# Patient Record
Sex: FEMALE | Race: OTHER | URBAN - NONMETROPOLITAN AREA
[De-identification: names, ages, dates, MRNs, and addresses within clinical notes are randomized per-mention and may not be internally consistent; named-entity substitution may affect disease eponyms.]

---

## 2024-04-25 ENCOUNTER — OFFICE VISIT (OUTPATIENT)
Dept: ENDOCRINOLOGY | Facility: CLINIC | Age: 44
End: 2024-04-25
Payer: COMMERCIAL

## 2024-04-25 VITALS
HEIGHT: 59 IN | DIASTOLIC BLOOD PRESSURE: 76 MMHG | HEART RATE: 104 BPM | SYSTOLIC BLOOD PRESSURE: 104 MMHG | OXYGEN SATURATION: 99 % | BODY MASS INDEX: 40.72 KG/M2 | WEIGHT: 202 LBS

## 2024-04-25 DIAGNOSIS — E66.01 CLASS 3 SEVERE OBESITY DUE TO EXCESS CALORIES WITH SERIOUS COMORBIDITY AND BODY MASS INDEX (BMI) OF 40.0 TO 44.9 IN ADULT (HCC): Primary | ICD-10-CM

## 2024-04-25 DIAGNOSIS — R79.89 ELEVATED DHEA: ICD-10-CM

## 2024-04-25 DIAGNOSIS — E88.819 INSULIN RESISTANCE: ICD-10-CM

## 2024-04-25 PROCEDURE — 99203 OFFICE O/P NEW LOW 30 MIN: CPT | Performed by: STUDENT IN AN ORGANIZED HEALTH CARE EDUCATION/TRAINING PROGRAM

## 2024-04-25 RX ORDER — SEMAGLUTIDE 0.25 MG/.5ML
0.25 INJECTION, SOLUTION SUBCUTANEOUS WEEKLY
COMMUNITY

## 2024-04-25 NOTE — PROGRESS NOTES
Adrianne Ames 43 y.o. female MRN: [id number]    Encounter: [id number]      Assessment/Plan     Elevated DHEAS  Class III obesity  Insulin resistance    Reassurance provided for DHEAS elevation, which is mild. Patient is asymptomatic and having regular periods. I suspect this result may be due to insulin resistance, which is suggested by insulin testing. We discussed nutritional approaches for optimization of insulin sensitivity, including focus on whole food products and avoidance of processed foods. Adrianne will work to optimize her aerobic and strength/resistance training. She intends to pursue initiation of wegovy, per our discussion. And she is aware that I am available to her in the future to comment on any additional testing or to address any concerns. At present, no further investigation will be pursued.     Problem List Items Addressed This Visit    None  Visit Diagnoses     Class 3 severe obesity due to excess calories with serious comorbidity and body mass index (BMI) of 40.0 to 44.9 in adult (HCC)    -  Primary    Insulin resistance        Elevated DHEA            RTC PRN    CC: abnormal labs    History of Present Illness     HPI:    Adrianne presents today for endocrine evaluation of elevated hormone labs. Patient reports recent testing from PCP including high DHEAS. Reports difficulty with weight. Has been following lifestyle plan including nutrition, calorie counting (uses LoseIt!), and walking 12-15k steps per day. She is looking to expand workup program to include bushra and strength training. Works job in IT. Reports routine and expected periods. No symptoms of oily skin, acne, undesired coarse hair growth or male pattern hair loss. Has had roughly 8lb weight loss in past 3-mo. She has been Rx wegovy, but has not initiated use. She denies pancreatitis or fam hx MTC/MEN.     Review of Systems   Constitutional:  Positive for fatigue. Negative for diaphoresis.   Gastrointestinal:  Negative for nausea and  "vomiting.        Bloating   Endocrine: Negative for polydipsia and polyuria.   All other systems reviewed and are negative.      Historical Information   Past Medical History:   Diagnosis Date   • Seasonal allergies      History reviewed. No pertinent surgical history.  Social History   Social History     Substance and Sexual Activity   Alcohol Use Yes     Social History     Substance and Sexual Activity   Drug Use Not Currently   • Types: Marijuana    Comment: Medical card     Social History     Tobacco Use   Smoking Status Former   • Current packs/day: 0.00   • Types: Cigarettes   • Quit date:    • Years since quittin.3   Smokeless Tobacco Never     Family History:   Family History   Problem Relation Age of Onset   • Diabetes type II Mother    • Liver cancer Father    • Diabetes type II Maternal Grandfather        Meds/Allergies   Current Outpatient Medications   Medication Sig Dispense Refill   • Semaglutide-Weight Management (Wegovy) 0.25 MG/0.5ML Inject 0.25 mg under the skin once a week     • Loratadine 10 MG CHEW Chew 10 mg daily       No current facility-administered medications for this visit.     No Known Allergies    Objective   Vitals: Blood pressure 104/76, pulse 104, height 4' 11\" (1.499 m), weight 91.6 kg (202 lb), SpO2 99%.    Physical Exam  Vitals reviewed.   Constitutional:       General: She is not in acute distress.     Appearance: Normal appearance.   HENT:      Head: Normocephalic and atraumatic.      Nose: Nose normal.   Eyes:      General: No scleral icterus.     Conjunctiva/sclera: Conjunctivae normal.   Pulmonary:      Effort: Pulmonary effort is normal. No respiratory distress.   Abdominal:      Palpations: Abdomen is soft.      Tenderness: There is no abdominal tenderness.   Musculoskeletal:         General: No deformity.      Cervical back: Normal range of motion.   Skin:     General: Skin is warm and dry.   Neurological:      General: No focal deficit present.      Mental " "Status: She is alert.   Psychiatric:         Mood and Affect: Mood normal.         Behavior: Behavior normal.         The history was obtained from the review of the chart, patient.    Lab Results:   Lab Results   Component Value Date/Time    Potassium 4.6 04/04/2024 07:48 AM    Potassium 4.5 06/30/2023 06:39 AM    Potassium 4.0 06/25/2023 12:19 PM    Chloride 105 04/04/2024 07:48 AM    Chloride 107 06/30/2023 06:39 AM    Chloride 109 (H) 06/25/2023 12:19 PM    Carbon Dioxide 20 (L) 04/04/2024 07:48 AM    Carbon Dioxide 21 (L) 06/30/2023 06:39 AM    Carbon Dioxide 19 (L) 06/25/2023 12:19 PM    BUN 26 (H) 04/04/2024 07:48 AM    BUN 17 06/30/2023 06:39 AM    BUN 11 06/25/2023 12:19 PM    Creatinine 0.7 04/04/2024 07:48 AM    Creatinine 0.7 06/30/2023 06:39 AM    Creatinine 0.6 06/25/2023 12:19 PM    Calcium 9.5 04/04/2024 07:48 AM    Calcium 9.2 06/30/2023 06:39 AM    Calcium 9.0 06/25/2023 12:19 PM    EGFR >90 04/04/2024 07:48 AM    EGFR >90 06/30/2023 06:39 AM    EGFR >90 06/25/2023 12:19 PM         Ref Range & Units 3 wk ago Comments   Insulin  3 - 25 uU/mL 14 The above reference interval is based on fasting status.     Component  Ref Range & Units 3 wk ago Comments   DHEA-Sulfate  19 - 231 mcg/dL 352 High  DHEA-S values fall with advancing age. For reference,  the reference intervals for 31-40 year old patients  are: Female  mcg/dL and Male 106-464 mcg/dL.    Test Performed at:  [facility] [address]  [id number]  José Acuna M.D., Ph.D.,Director of Laboratories       Imaging Studies:  ?  ?     I have personally reviewed pertinent reports.      Portions of the record may have been created with voice recognition software. Occasional wrong word or \"sound a like\" substitutions may have occurred due to the inherent limitations of voice recognition software. Read the chart carefully and recognize, using context, where substitutions have occurred.    "

## 2024-04-25 NOTE — PATIENT INSTRUCTIONS
"Try to eat real whole foods, as close to their original form as possible and avoid processed foods, or meats with nitrates - shop the perimeter of the store!    Remember the principles:  Protein at every meal  Veggies at least two meals/day  Small amounts of good fat at each meal stabilize blood sugar and help with hunger    Great resources include:  dietHealthyChic  Kelly's low carb website: www.[employer]/lowcarb/    Blood Sugar and insulin are critical to weight  Blood sugar response varies based on what food is eaten  Fats and protein provide longer-term energy without causing spikes in blood sugar. Carbohydrates break into glucose and cause blood sugar spikes.  The body wants to keep the blood sugar in the normal range  Insulin is a hormone made by the pancreas in response to increased blood sugar (glucose)  Insulin tells the body to store the unused sugar as fat and blocks the breakdown of fat stores (extra glucose from carbs is stored in fat cells around our middle, in our liver and a little bit in muscle)    So, the key to controlling weight, preventing metabolic disease, and improving energy is to regulate blood sugar and therefore, insulin release.    Low Carb Food Plan: The Basics    Principles:  Eat 3 servings (4-6 ounces) of protein each day, one serving at each meal  Eat lots of non-starchy vegetables, at least 8 (1/2 cup) servings/day (or, >15 \"net carbs\")  Eat small amounts (1 Tbsp) of added fat at each meal  Eat small amounts from the “other carbohydrate” category, up to 6 servings/day. Serving size varies with each category of “other carbohydrate”. In general, a serving should have 10 grams or fewer carbohydrates. The number of servings you will eat is based on your \"net carb\" target.    Protein: seafood (fish and shellfish), poultry, eggs, beef, buffalo, lamb, veal, venison, pork    Non-starchy vegetables: All vegetables except starchy vegetables (see below)    Fat: olive, coconut, flaxseed, sesame, " "walnut, avocado oils, butter, mayonnaise, salad dressings with less than 2g carb/2 Tbs. serving    Other carbohydrates:  Nuts, seeds, nut butter and nut flours  Cheese and Dairy  Beans  Starchy vegetables (beets, carrots, corn, parsnips, peas, potato (all), rutabaga, squash (acorn and butternut, only)  Whole grains (e.g. barley, brown rice, cous cous, grits, oatmeal, polenta, whole wheat pasta, whole wheat bread)  Alcohol    References: www.[employer].[place], www.dietdoctor.com, www.[employer].[place]    If you develop dizziness/fatigue or muscle cramping  you can add salt to your food or drink bouillon/broth.    Remember:  Net carbs(grams)=total carbs (grams) - fiber (grams) - sugar alcohols (grams)    Low Carb Replacement Options    Bread/Wraps  [place], Carb style, slice 60 nathan, 5 net carbs, 5g protein  [place] Old Tyme 647 bread, slice 40 nathan, 6g net carbs, 2gprotein  [place]-Carb Balance tortilla, 120 nathan, 6g net carbs, 5g protein  Giant brand high fiber tortilla, 70 nathan, 6g net carbs, 4g protein, in the dairy case  Rachid's Low-in-Carb wrap,, 60 nathan, 4g net carbs, 6g protein says \"Let's Skip the [place]\" on the front  Yaw's Oat Bran and Whole Wheat joslyn bread, 60 nathan, 5g net carbs, 6g protein  Giant high fiber tortilla, 70 nathan, 6 net carbs, 4g protein-in the dairy case  Yaw's Reduced Carb joslyn and lavash (has red in the label)  Fit & Active wheat bread 40 nathan, 6 net carbs  Toyin 80 nathan, 3 net carbs    Crackers  Estela's Gone Crackers, Super Seed, 13 crackers, 160 nathan, 16g net carb, 3g protein  [place] crisp'n light 7 grains, 3 slices, 70 nathan, 12g net carbs, 2g protein  Whisps cheese Crisps 23 crisps = 1 carb  Moon Cheese 6-7 pieces = 1 carb    Rice  Cauliflower Rice (fresh, frozen or homemade!)    Pasta  Zucchini noodles  Banza Pasta ([place]a Pasta)  Explore [employer] brand has several versions of bean pasta, available at the[employer].com's (in the  section), Giant in the \"organic\" section and other stores as " well    Beans  Black soy beans have 1/10th the carbs of most beans: canned,